# Patient Record
Sex: FEMALE | Race: WHITE | Employment: FULL TIME | URBAN - METROPOLITAN AREA
[De-identification: names, ages, dates, MRNs, and addresses within clinical notes are randomized per-mention and may not be internally consistent; named-entity substitution may affect disease eponyms.]

---

## 2023-03-06 ENCOUNTER — OFFICE VISIT (OUTPATIENT)
Dept: ORTHOPEDIC SURGERY | Age: 58
End: 2023-03-06

## 2023-03-06 ENCOUNTER — HOSPITAL ENCOUNTER (OUTPATIENT)
Dept: ORTHOPEDIC SURGERY | Age: 58
Discharge: HOME | End: 2023-03-08
Payer: COMMERCIAL

## 2023-03-06 DIAGNOSIS — M54.50 LOW BACK PAIN, UNSPECIFIED BACK PAIN LATERALITY, UNSPECIFIED CHRONICITY, UNSPECIFIED WHETHER SCIATICA PRESENT: Primary | ICD-10-CM

## 2023-03-06 DIAGNOSIS — M76.31 ILIOTIBIAL BAND SYNDROME OF RIGHT SIDE: ICD-10-CM

## 2023-03-06 DIAGNOSIS — M54.50 LOW BACK PAIN, UNSPECIFIED BACK PAIN LATERALITY, UNSPECIFIED CHRONICITY, UNSPECIFIED WHETHER SCIATICA PRESENT: ICD-10-CM

## 2023-03-06 PROCEDURE — 72110 X-RAY EXAM L-2 SPINE 4/>VWS: CPT

## 2023-03-06 RX ORDER — CYCLOBENZAPRINE HCL 10 MG
TABLET ORAL
COMMUNITY
Start: 2022-12-20

## 2023-03-06 NOTE — PROGRESS NOTES
Subjective:      Patient ID: Simone King is a 56 y.o. female who presents today for:  Chief Complaint   Patient presents with    Lower Back Pain     Pt states low back pain since Dec 16, 2022. Right hip pain started in Dec 16,2022. Lifted Pt out of wheelchair approx. 160lbs then bent over then when stood back up excruciating pain up and down right hip and right low back region. Pt did 2x a week for 15 sessions of physical therapy. New Patient. Referred by Dr. Mahsa Cardenas at Urgent care. HPI:  The patient is a 47-year-old female who comes in with right hip pain and low back pain since December 16, 2022 where she was injured on the job. This is a Worker's Compensation claim. She does not describe pain extending past the proximal thigh. She has more difficulty after squatting down and then getting back up. No significant radicular complaints in either leg. No past medical history on file. No past surgical history on file. Tobacco Use      Smoking status: Not on file      Smokeless tobacco: Not on file     has no history on file for drug use. No Known Allergies    Current Outpatient Medications:     cyclobenzaprine (FLEXERIL) 10 MG tablet, TAKE ONE TABLET BY MOUTH EVERY 8 HOURS AS NEEDED FOR MUSCLE SPASMS, Disp: , Rfl:     Objective: There were no vitals taken for this visit. Physical Exam:  The patient can rise up on their toes and rise up on her heels. 5 out of 5 hip flexion and knee extension strength bilaterally. Sensation intact bilaterally in the lower extremities from L2-S1. No pain to palpation over the right greater trochanteric region    Radiographs and Laboratory Studies:     Diagnostic Imaging Studies:    XR LUMBAR SPINE (MIN 4 VIEWS)  4 views of the lumbar spine were taken today. She has severe degenerative   disc disease with a vacuum disc at L4-5 and a retrolisthesis at L4-5. Additional L5-S1 degenerative disc disease. Assessment:       Diagnosis Orders   1.  Low back pain, unspecified back pain laterality, unspecified chronicity, unspecified whether sciatica present  XR LUMBAR SPINE (MIN 4 VIEWS)      2. Iliotibial band syndrome of right side             Plan:      I have shown her 2 different stretches for iliotibial band syndrome which I believe she has a component of. I cannot completely rule out the back as she does have degenerative lumbar stenosis with retrolisthesis at L4-5. I told her that the back in the hip can have overlap. I am ordering an MRI of her lumbar spine. After the MRIs been performed then I will see her back for continued evaluation and management.         Gomez Nicole DO  Orthopedic and Spine Surgeon  [facility]  [phone]

## 2023-03-08 ENCOUNTER — TELEPHONE (OUTPATIENT)
Dept: ORTHOPEDIC SURGERY | Age: 58
End: 2023-03-08

## 2023-03-08 NOTE — TELEPHONE ENCOUNTER
Maye from [facility] called requesting C-9 form for MRI to be fax at [phone]

## 2023-03-13 DIAGNOSIS — M54.50 LOW BACK PAIN, UNSPECIFIED BACK PAIN LATERALITY, UNSPECIFIED CHRONICITY, UNSPECIFIED WHETHER SCIATICA PRESENT: Primary | ICD-10-CM

## 2023-03-20 ENCOUNTER — HOSPITAL ENCOUNTER (OUTPATIENT)
Dept: MRI IMAGING | Age: 58
Discharge: HOME | End: 2023-03-22
Payer: COMMERCIAL

## 2023-03-20 DIAGNOSIS — M54.50 LOW BACK PAIN, UNSPECIFIED BACK PAIN LATERALITY, UNSPECIFIED CHRONICITY, UNSPECIFIED WHETHER SCIATICA PRESENT: ICD-10-CM

## 2023-03-20 PROCEDURE — 72148 MRI LUMBAR SPINE W/O DYE: CPT

## 2023-03-27 ENCOUNTER — OFFICE VISIT (OUTPATIENT)
Dept: ORTHOPEDIC SURGERY | Age: 58
End: 2023-03-27

## 2023-03-27 VITALS
OXYGEN SATURATION: 98 % | WEIGHT: 144 LBS | HEART RATE: 115 BPM | BODY MASS INDEX: 23.14 KG/M2 | TEMPERATURE: 98 F | HEIGHT: 66 IN

## 2023-03-27 DIAGNOSIS — M76.31 ILIOTIBIAL BAND SYNDROME OF RIGHT SIDE: ICD-10-CM

## 2023-03-27 DIAGNOSIS — M51.26 HERNIATED LUMBAR INTERVERTEBRAL DISC: Primary | ICD-10-CM

## 2023-03-27 NOTE — PROGRESS NOTES
neural  foraminal narrowing. Impression: 1. Large central and right paracentral disc herniation at L4-5 causing  moderate to severe central canal stenosis and severe right and moderate left  subarticular recess stenosis. 2. Multilevel neural foraminal stenosis, most prominent (severe) on the left  at L5-S1. Assessment:       Diagnosis Orders   1. Herniated lumbar intervertebral disc        2. Iliotibial band syndrome of right side               Plan:      She is here today for review of her lumbar spine MRI. She has an L4-5 herniated disc slightly off to the right side. We talked about continued physical therapy and stretching versus caudal epidural steroid block versus laminectomy and discectomy. She would like to think about the information I gave her. She most likely would like to stay with nonoperative measures at this time. She will consider an injection if symptoms worsen. She can come back to see me on an as-needed basis. This is a Worker's Compensation claim.         Nora Wasserman DO  Orthopedic and Spine Surgeon  [facility]  [phone]

## 2023-04-11 DIAGNOSIS — Z00.00 ENCOUNTER FOR PREVENTIVE CARE: ICD-10-CM

## 2023-05-25 ENCOUNTER — TELEPHONE (OUTPATIENT)
Dept: PRIMARY CARE | Facility: CLINIC | Age: 58
End: 2023-05-25

## 2023-05-25 DIAGNOSIS — Z00.00 HEALTHCARE MAINTENANCE: ICD-10-CM

## 2023-05-25 DIAGNOSIS — Z00.00 HEALTH MAINTENANCE EXAMINATION: Primary | ICD-10-CM

## 2025-08-07 ENCOUNTER — HOSPITAL ENCOUNTER (OUTPATIENT)
Dept: RADIOLOGY | Facility: CLINIC | Age: 60
Discharge: HOME | End: 2025-08-07
Payer: COMMERCIAL

## 2025-08-07 ENCOUNTER — OFFICE VISIT (OUTPATIENT)
Dept: ORTHOPEDIC SURGERY | Facility: CLINIC | Age: 60
End: 2025-08-07
Payer: COMMERCIAL

## 2025-08-07 DIAGNOSIS — M51.26 DISC DISPLACEMENT, LUMBAR: ICD-10-CM

## 2025-08-07 DIAGNOSIS — M54.50 LUMBAR PAIN: ICD-10-CM

## 2025-08-07 DIAGNOSIS — M54.50 LUMBAR PAIN: Primary | ICD-10-CM

## 2025-08-07 DIAGNOSIS — S33.5XXA LUMBAR BACK SPRAIN, INITIAL ENCOUNTER: ICD-10-CM

## 2025-08-07 PROCEDURE — 99204 OFFICE O/P NEW MOD 45 MIN: CPT | Performed by: ORTHOPAEDIC SURGERY

## 2025-08-07 PROCEDURE — 72110 X-RAY EXAM L-2 SPINE 4/>VWS: CPT

## 2025-08-07 PROCEDURE — 99202 OFFICE O/P NEW SF 15 MIN: CPT | Performed by: ORTHOPAEDIC SURGERY

## 2025-08-07 NOTE — PROGRESS NOTES
"Kavitha Vega is a 60 y.o. 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    HPI:  60-year-old female here for new patient evaluation [facility] injury in 2022.  Here for back pain.  She denies any fever chills nausea vomiting night sweats.  She has no bowel or bladder complaints.    Physical exam:  Well-nourished, well kept.No lymphangitis or lymphadenopathy in the examined extremities.  Gait normal.  Can stand on heels and toes.   Examination of the back shows tenderness in the paraspinous musculature.  There is decreased range of motion in all directions due to guarding/muscle spasms and pain at extremes.  There is good strength and no instability.  Examination of the lower extremities reveals no point tenderness, swelling, or deformity.  Range of motion of the hips, knees, and ankles are full without crepitance, instability, or exacerbation of pain.  Strength is 5/5 throughout.  No redness, abrasions, or lesions on extremities  Gross sensation intact in the extremities.  Deep tendon reflexes 2+ bilateral. Clonus negative.  Affect normal.  Alert and oriented ×3.  Coordination normal.    Imaging studies:  We ordered and reviewed AP lateral flexion-extension x-rays today.  An MRI from [facility] from March 2023 was reviewed.    Assessment:  60-year-old female here for new patient evaluation [facility] injury in 2022.  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  This is mostly a back pain issue in the lumbosacral area but will occasionally go into the buttocks and lateral thigh and cause \"tightness \"if her back pain gets severely flared up.  She also reports pain in her left big toe.  She has no history of spine surgery or epidural injections.  She has done physical therapy in the past which did not give her " significant relief.  She has done acupuncture recently which she says is the thing that has helped the most.  An MRI at [facility] from 2023 shows a paracentral disc herniation at L4-5 that is causing moderate to severe central canal stenosis.  This patient saw Dr. Walton in 2023 and he recommended conservative treatment.    We have ordered and reviewed tests, x-rays, MRI.  We reviewed the notes from Dr. Walton from March 2023.  This is an exacerbation of a chronic problem that is affecting her bodily function.    In a face-to-face encounter, I performed a history and physical examination, discussed pertinent diagnostic studies if indicated, and discussed diagnosis and management strategies with both the patient and the midlevel provider.  I reviewed the midlevel's note and agree with the documented findings and plan of care.    Patient with a work injury in 2022.  She has back pain that goes into her buttocks and hips bilaterally.  The back seems to be the most.  It started to radiate cephalad.  X-rays were ordered and reviewed today and she does have degenerative disc at L4-5 with some air in that disc.  She has an MRI done in 2023 that does show an L4-5 paracentral disc herniation.  She has had no recent treatments.  We are going to get her into some physical therapy.  We are going to order an MRI of her lumbar spine.  We will see her back after she gets her MRI for this severe exacerbation of her chronic problem.    Cm Haro MD  Orthopedic surgery

## 2025-08-19 DIAGNOSIS — Z12.31 ENCOUNTER FOR SCREENING MAMMOGRAM FOR BREAST CANCER: ICD-10-CM

## 2025-08-23 ENCOUNTER — HOSPITAL ENCOUNTER (OUTPATIENT)
Dept: RADIOLOGY | Facility: HOSPITAL | Age: 60
Discharge: HOME | End: 2025-08-23
Payer: COMMERCIAL

## 2025-08-23 DIAGNOSIS — M51.26 DISC DISPLACEMENT, LUMBAR: ICD-10-CM

## 2025-08-23 DIAGNOSIS — S33.5XXA LUMBAR BACK SPRAIN, INITIAL ENCOUNTER: ICD-10-CM

## 2025-08-23 DIAGNOSIS — M54.50 LUMBAR PAIN: ICD-10-CM

## 2025-08-23 PROCEDURE — 72148 MRI LUMBAR SPINE W/O DYE: CPT | Performed by: RADIOLOGY

## 2025-08-23 PROCEDURE — 72148 MRI LUMBAR SPINE W/O DYE: CPT

## 2025-08-29 ENCOUNTER — OFFICE VISIT (OUTPATIENT)
Dept: ORTHOPEDIC SURGERY | Facility: CLINIC | Age: 60
End: 2025-08-29
Payer: COMMERCIAL

## 2025-08-29 DIAGNOSIS — M54.50 LUMBAR PAIN: ICD-10-CM

## 2025-08-29 PROCEDURE — 99202 OFFICE O/P NEW SF 15 MIN: CPT | Performed by: ORTHOPAEDIC SURGERY
